# Patient Record
Sex: FEMALE | Race: WHITE | NOT HISPANIC OR LATINO | URBAN - METROPOLITAN AREA
[De-identification: names, ages, dates, MRNs, and addresses within clinical notes are randomized per-mention and may not be internally consistent; named-entity substitution may affect disease eponyms.]

---

## 2017-01-26 PROBLEM — O09.529 SUPERVISION OF HIGH-RISK PREGNANCY OF ELDERLY MULTIGRAVIDA (HCC): Status: ACTIVE | Noted: 2017-01-26

## 2017-01-26 PROBLEM — O34.219 PREVIOUS CESAREAN DELIVERY, ANTEPARTUM CONDITION OR COMPLICATION (HCC): Status: ACTIVE | Noted: 2017-01-26

## 2017-01-26 PROBLEM — O34.219 PREVIOUS CESAREAN DELIVERY, ANTEPARTUM CONDITION OR COMPLICATION: Status: ACTIVE | Noted: 2017-01-26

## 2017-01-26 PROBLEM — O09.529 SUPERVISION OF HIGH-RISK PREGNANCY OF ELDERLY MULTIGRAVIDA: Status: ACTIVE | Noted: 2017-01-26

## 2017-01-27 PROCEDURE — 87389 HIV-1 AG W/HIV-1&-2 AB AG IA: CPT | Performed by: OBSTETRICS & GYNECOLOGY

## 2017-01-27 PROCEDURE — 86762 RUBELLA ANTIBODY: CPT | Performed by: OBSTETRICS & GYNECOLOGY

## 2017-01-27 PROCEDURE — 86901 BLOOD TYPING SEROLOGIC RH(D): CPT | Performed by: OBSTETRICS & GYNECOLOGY

## 2017-01-27 PROCEDURE — 87491 CHLMYD TRACH DNA AMP PROBE: CPT | Performed by: OBSTETRICS & GYNECOLOGY

## 2017-01-27 PROCEDURE — 87591 N.GONORRHOEAE DNA AMP PROB: CPT | Performed by: OBSTETRICS & GYNECOLOGY

## 2017-01-27 PROCEDURE — 86850 RBC ANTIBODY SCREEN: CPT | Performed by: OBSTETRICS & GYNECOLOGY

## 2017-01-27 PROCEDURE — 86900 BLOOD TYPING SEROLOGIC ABO: CPT | Performed by: OBSTETRICS & GYNECOLOGY

## 2017-01-27 PROCEDURE — 87340 HEPATITIS B SURFACE AG IA: CPT | Performed by: OBSTETRICS & GYNECOLOGY

## 2017-01-27 PROCEDURE — 36415 COLL VENOUS BLD VENIPUNCTURE: CPT | Performed by: OBSTETRICS & GYNECOLOGY

## 2017-01-27 PROCEDURE — 86780 TREPONEMA PALLIDUM: CPT | Performed by: OBSTETRICS & GYNECOLOGY

## 2017-01-27 PROCEDURE — 87086 URINE CULTURE/COLONY COUNT: CPT | Performed by: OBSTETRICS & GYNECOLOGY

## 2017-01-27 PROCEDURE — 85025 COMPLETE CBC W/AUTO DIFF WBC: CPT | Performed by: OBSTETRICS & GYNECOLOGY

## 2017-05-19 PROCEDURE — 82951 GLUCOSE TOLERANCE TEST (GTT): CPT | Performed by: OBSTETRICS & GYNECOLOGY

## 2017-05-19 PROCEDURE — 36415 COLL VENOUS BLD VENIPUNCTURE: CPT | Performed by: OBSTETRICS & GYNECOLOGY

## 2017-05-19 PROCEDURE — 82952 GTT-ADDED SAMPLES: CPT | Performed by: OBSTETRICS & GYNECOLOGY

## 2017-05-19 PROCEDURE — 85025 COMPLETE CBC W/AUTO DIFF WBC: CPT | Performed by: OBSTETRICS & GYNECOLOGY

## 2017-06-19 PROCEDURE — 87081 CULTURE SCREEN ONLY: CPT | Performed by: OBSTETRICS & GYNECOLOGY

## 2017-06-19 PROCEDURE — 87147 CULTURE TYPE IMMUNOLOGIC: CPT | Performed by: OBSTETRICS & GYNECOLOGY

## 2017-08-28 PROCEDURE — 87624 HPV HI-RISK TYP POOLED RSLT: CPT | Performed by: OBSTETRICS & GYNECOLOGY

## 2017-08-28 PROCEDURE — 88175 CYTOPATH C/V AUTO FLUID REDO: CPT | Performed by: OBSTETRICS & GYNECOLOGY

## 2019-09-11 PROCEDURE — 88175 CYTOPATH C/V AUTO FLUID REDO: CPT | Performed by: OBSTETRICS & GYNECOLOGY

## 2019-09-11 PROCEDURE — 87624 HPV HI-RISK TYP POOLED RSLT: CPT | Performed by: OBSTETRICS & GYNECOLOGY

## 2019-09-16 ENCOUNTER — WALK IN (OUTPATIENT)
Dept: URGENT CARE | Age: 39
End: 2019-09-16

## 2019-09-16 DIAGNOSIS — H61.23 BILATERAL IMPACTED CERUMEN: Primary | ICD-10-CM

## 2019-09-16 PROCEDURE — 69209 REMOVE IMPACTED EAR WAX UNI: CPT | Performed by: NURSE PRACTITIONER

## 2019-09-16 PROCEDURE — 99203 OFFICE O/P NEW LOW 30 MIN: CPT | Performed by: NURSE PRACTITIONER

## 2019-09-16 SDOH — HEALTH STABILITY: MENTAL HEALTH: HOW OFTEN DO YOU HAVE A DRINK CONTAINING ALCOHOL?: 2-4 TIMES A MONTH

## 2019-09-16 ASSESSMENT — ENCOUNTER SYMPTOMS
DIZZINESS: 0
FEVER: 0
HEADACHES: 0

## 2019-09-16 ASSESSMENT — PAIN SCALES - GENERAL: PAINLEVEL: 0

## 2021-05-13 ENCOUNTER — APPOINTMENT (OUTPATIENT)
Dept: ULTRASOUND IMAGING | Age: 41
End: 2021-05-13
Attending: EMERGENCY MEDICINE
Payer: COMMERCIAL

## 2021-05-13 ENCOUNTER — HOSPITAL ENCOUNTER (EMERGENCY)
Age: 41
Discharge: HOME | End: 2021-05-13
Attending: EMERGENCY MEDICINE
Payer: COMMERCIAL

## 2021-05-13 ENCOUNTER — OFFICE VISIT (OUTPATIENT)
Dept: FAMILY MEDICINE CLINIC | Facility: CLINIC | Age: 41
End: 2021-05-13

## 2021-05-13 VITALS
RESPIRATION RATE: 18 BRPM | OXYGEN SATURATION: 100 % | DIASTOLIC BLOOD PRESSURE: 82 MMHG | HEIGHT: 64 IN | BODY MASS INDEX: 33.8 KG/M2 | TEMPERATURE: 98 F | HEART RATE: 88 BPM | SYSTOLIC BLOOD PRESSURE: 151 MMHG | WEIGHT: 198 LBS

## 2021-05-13 DIAGNOSIS — K80.20 CALCULUS OF GALLBLADDER WITHOUT CHOLECYSTITIS WITHOUT OBSTRUCTION: Primary | ICD-10-CM

## 2021-05-13 DIAGNOSIS — Z02.9 ENCOUNTER FOR ADMINISTRATIVE EXAMINATIONS: Primary | ICD-10-CM

## 2021-05-13 DIAGNOSIS — K80.50 BILIARY COLIC: ICD-10-CM

## 2021-05-13 PROCEDURE — 81001 URINALYSIS AUTO W/SCOPE: CPT | Performed by: EMERGENCY MEDICINE

## 2021-05-13 PROCEDURE — 80053 COMPREHEN METABOLIC PANEL: CPT | Performed by: EMERGENCY MEDICINE

## 2021-05-13 PROCEDURE — 81015 MICROSCOPIC EXAM OF URINE: CPT | Performed by: EMERGENCY MEDICINE

## 2021-05-13 PROCEDURE — 83690 ASSAY OF LIPASE: CPT | Performed by: EMERGENCY MEDICINE

## 2021-05-13 PROCEDURE — 99284 EMERGENCY DEPT VISIT MOD MDM: CPT | Performed by: EMERGENCY MEDICINE

## 2021-05-13 PROCEDURE — 96360 HYDRATION IV INFUSION INIT: CPT | Performed by: EMERGENCY MEDICINE

## 2021-05-13 PROCEDURE — 76700 US EXAM ABDOM COMPLETE: CPT | Performed by: EMERGENCY MEDICINE

## 2021-05-13 PROCEDURE — 85025 COMPLETE CBC W/AUTO DIFF WBC: CPT | Performed by: EMERGENCY MEDICINE

## 2021-05-13 PROCEDURE — 81025 URINE PREGNANCY TEST: CPT | Performed by: EMERGENCY MEDICINE

## 2021-05-13 RX ORDER — DOCUSATE SODIUM 100 MG/1
100 CAPSULE, LIQUID FILLED ORAL 2 TIMES DAILY
COMMUNITY

## 2021-05-13 NOTE — ED INITIAL ASSESSMENT (HPI)
States right upper quadrant pain radiates to her back and down to right hip. States symptoms started on Easter and have been off and on since. This week pain has been persistent every day with nausea pain worse with movement.  Denies injury

## 2021-05-13 NOTE — ED PROVIDER NOTES
Patient Seen in: THE [facility] In [place]      History   Patient presents with:  Abdomen/Flank Pain    Stated Complaint: abdominal pain, sent by walk in     HPI/Subjective:   HPI    42-year-old female presents emergency room for evaluati Current:/82   Pulse 88   Temp 98.4 °F (36.9 °C) (Temporal)   Resp 18   Ht 162.6 cm (5' 4\")   Wt 89.8 kg   LMP 04/20/2021   SpO2 100%   BMI 33.99 kg/m²         Physical Exam    GENERAL: Patient is awake, alert, well-appearing, in no acute distr RAINBOW DRAW LAVENDER   RAINBOW DRAW LIGHT GREEN   RAINBOW DRAW GOLD                   MDM      Patient had IV established, blood work obtained. Ultrasound performed which revealed cholelithiasis, no biliary dilatation, no pericholecystic fluid.   CBC an

## 2021-05-13 NOTE — PROGRESS NOTES
Called pt regarding [facility] appointment. Pt presents with right sided abdominal pain that started around Easter and now worsening. Pain is described as dull and pinching. Now having upper shoulder pain. HX Caseation x3. A little nausea today.    No vomiting

## 2021-05-26 VITALS
OXYGEN SATURATION: 98 % | TEMPERATURE: 97.5 F | DIASTOLIC BLOOD PRESSURE: 68 MMHG | HEART RATE: 64 BPM | RESPIRATION RATE: 16 BRPM | BODY MASS INDEX: 34.31 KG/M2 | HEIGHT: 64 IN | SYSTOLIC BLOOD PRESSURE: 128 MMHG | WEIGHT: 201 LBS

## 2025-03-01 ENCOUNTER — OFFICE VISIT (OUTPATIENT)
Dept: FAMILY MEDICINE CLINIC | Facility: CLINIC | Age: 45
End: 2025-03-01
Payer: COMMERCIAL

## 2025-03-01 VITALS
SYSTOLIC BLOOD PRESSURE: 148 MMHG | HEART RATE: 95 BPM | TEMPERATURE: 98 F | RESPIRATION RATE: 18 BRPM | BODY MASS INDEX: 36 KG/M2 | OXYGEN SATURATION: 99 % | HEIGHT: 63 IN | DIASTOLIC BLOOD PRESSURE: 88 MMHG

## 2025-03-01 DIAGNOSIS — J40 BRONCHITIS WITH WHEEZING: ICD-10-CM

## 2025-03-01 DIAGNOSIS — J01.40 ACUTE NON-RECURRENT PANSINUSITIS: Primary | ICD-10-CM

## 2025-03-01 DIAGNOSIS — H66.001 NON-RECURRENT ACUTE SUPPURATIVE OTITIS MEDIA OF RIGHT EAR WITHOUT SPONTANEOUS RUPTURE OF TYMPANIC MEMBRANE: ICD-10-CM

## 2025-03-01 PROCEDURE — 3079F DIAST BP 80-89 MM HG: CPT | Performed by: NURSE PRACTITIONER

## 2025-03-01 PROCEDURE — 99202 OFFICE O/P NEW SF 15 MIN: CPT | Performed by: NURSE PRACTITIONER

## 2025-03-01 PROCEDURE — 3077F SYST BP >= 140 MM HG: CPT | Performed by: NURSE PRACTITIONER

## 2025-03-01 RX ORDER — PREDNISONE 20 MG/1
40 TABLET ORAL DAILY
Qty: 10 TABLET | Refills: 0 | Status: SHIPPED | OUTPATIENT
Start: 2025-03-01 | End: 2025-03-06

## 2025-03-01 RX ORDER — ALBUTEROL SULFATE 90 UG/1
1-2 INHALANT RESPIRATORY (INHALATION) EVERY 4 HOURS PRN
Qty: 1 EACH | Refills: 0 | Status: SHIPPED | OUTPATIENT
Start: 2025-03-01

## 2025-03-01 NOTE — PROGRESS NOTES
CHIEF COMPLAINT:   cough      HPI:   Nila Edwards is a 45 year old female who presents for ill symptoms for 3 weeks. Patient reports diagnosed with influenza A, fever in beginning. Reports chest congestion, right ear pain, sinus pain and pressure. Denies fever. Symptoms have been worsening since onset.  Treating symptoms with mucinex, benadryl, sudafed, dayquil/nyquil, tylenol/motrin.      Current Outpatient Medications   Medication Sig Dispense Refill    amoxicillin clavulanate 875-125 MG Oral Tab Take 1 tablet by mouth 2 (two) times daily for 7 days. 14 tablet 0    predniSONE 20 MG Oral Tab Take 2 tablets (40 mg total) by mouth daily for 5 days. Take with food 10 tablet 0    albuterol (PROAIR HFA) 108 (90 Base) MCG/ACT Inhalation Aero Soln Inhale 1-2 puffs into the lungs every 4 (four) hours as needed for Wheezing or Shortness of Breath. 1 each 0    pantoprazole 40 MG Oral Tab EC Take 1 tablet (40 mg total) by mouth 2 (two) times daily before meals. 120 tablet 0    docusate sodium 100 MG Oral Cap Take 100 mg by mouth 2 (two) times daily. (Patient not taking: Reported on 10/21/2021)        Past Medical History:    Diabetes (HCC)    IS FOLLOWED BY PCP WITH PERIODIC HGBA1C LEVELS    Elevated blood pressure reading in office without diagnosis of hypertension    PT SEES PCP    Genitourinary disease    HX UTIS      Past Surgical History:   Procedure Laterality Date      //    in 2017 she had BEBA    Cholecystectomy      Oral surgery           Social History     Socioeconomic History    Marital status:    Tobacco Use    Smoking status: Never    Smokeless tobacco: Never   Vaping Use    Vaping status: Never Used   Substance and Sexual Activity    Alcohol use: No    Drug use: No    Sexual activity: Not Currently         REVIEW OF SYSTEMS:   GENERAL: feels well otherwise, good appetite  SKIN: no rashes or abnormal skin lesions  HEENT: See HPI  LUNGS: See HPI  CARDIOVASCULAR:  denies chest pain or palpitations   GI: denies N/V/C or abdominal pain  NEURO: Denies dizziness    EXAM:   /88   Pulse 95   Temp 98.1 °F (36.7 °C)   Resp 18   Ht 5' 3\" (1.6 m)   SpO2 99%   Breastfeeding No   BMI 36.23 kg/m²   GENERAL: well developed, well nourished, in no apparent distress  SKIN: no rashes, no suspicious lesions  HEENT: atraumatic, normocephalic. conjunctiva clear. Right TM: erythematous. left TM gray, no bulging, no retraction, + fluid, bony landmarks intact. clear nasal discharge, nasal mucosa erythematous and swollen. Oral mucosa pink, moist. Posterior pharynx is not erythematous. no exudates. + Sinus tenderness with palpation.   THROAT:NECK: Supple, non-tender  LUNGS: rhonchi and wheezing bilteral  CARDIO: RRR without murmur  EXTREMITIES: no cyanosis, clubbing or edema  LYMP: bilateral anterior cervical lymphadenopathy.    ASSESSMENT AND PLAN:   Nila Edwards is a 45 year old female who presents with     Diagnoses and all orders for this visit:    Acute non-recurrent pansinusitis  -     amoxicillin clavulanate 875-125 MG Oral Tab; Take 1 tablet by mouth 2 (two) times daily for 7 days.    Non-recurrent acute suppurative otitis media of right ear without spontaneous rupture of tympanic membrane  -     amoxicillin clavulanate 875-125 MG Oral Tab; Take 1 tablet by mouth 2 (two) times daily for 7 days.    Bronchitis with wheezing  -     predniSONE 20 MG Oral Tab; Take 2 tablets (40 mg total) by mouth daily for 5 days. Take with food  -     albuterol (PROAIR HFA) 108 (90 Base) MCG/ACT Inhalation Aero Soln; Inhale 1-2 puffs into the lungs every 4 (four) hours as needed for Wheezing or Shortness of Breath.     Declines chest xray.   Will treat with Augmentin for sinus and ear infection. Prednisone and inhaler for wheezing.   Any worsening symptoms consider xray.   Risks, benefits, and side effects of medication explained and discussed.    Discussed physical exam and hpi with pt. Treatment  options discussed with patient and explained in detail. We reviewed symptomatic care at home. The risks, benefits and potential side effects of possible medications were reviewed. Alternatives were discussed. Monitoring parameters and expected course outlined. Patient to call PCP or go to emergency department if symptoms fail to respond as outlined, or worsen in any way. The patient agreed with the plan.  See Patient Handout    The patient indicates understanding of these issues and agrees to the plan.  The patient is asked to follow up with PCP if sx's persist or worsen.

## 2025-03-07 ENCOUNTER — HOSPITAL ENCOUNTER (OUTPATIENT)
Dept: GENERAL RADIOLOGY | Age: 45
Discharge: HOME | End: 2025-03-07
Attending: PHYSICIAN ASSISTANT
Payer: COMMERCIAL

## 2025-03-07 ENCOUNTER — OFFICE VISIT (OUTPATIENT)
Dept: FAMILY MEDICINE CLINIC | Facility: CLINIC | Age: 45
End: 2025-03-07
Payer: COMMERCIAL

## 2025-03-07 VITALS
OXYGEN SATURATION: 96 % | DIASTOLIC BLOOD PRESSURE: 92 MMHG | RESPIRATION RATE: 18 BRPM | TEMPERATURE: 98 F | WEIGHT: 149 LBS | HEART RATE: 84 BPM | SYSTOLIC BLOOD PRESSURE: 144 MMHG | BODY MASS INDEX: 26 KG/M2

## 2025-03-07 DIAGNOSIS — H66.001 ACUTE SUPPURATIVE OTITIS MEDIA OF RIGHT EAR WITHOUT SPONTANEOUS RUPTURE OF TYMPANIC MEMBRANE, RECURRENCE NOT SPECIFIED: Primary | ICD-10-CM

## 2025-03-07 DIAGNOSIS — J32.9 SINOBRONCHITIS: ICD-10-CM

## 2025-03-07 DIAGNOSIS — R05.1 ACUTE COUGH: ICD-10-CM

## 2025-03-07 DIAGNOSIS — J40 SINOBRONCHITIS: ICD-10-CM

## 2025-03-07 PROCEDURE — 71046 X-RAY EXAM CHEST 2 VIEWS: CPT | Performed by: PHYSICIAN ASSISTANT

## 2025-03-07 RX ORDER — ALBUTEROL SULFATE 0.83 MG/ML
2.5 SOLUTION RESPIRATORY (INHALATION) EVERY 6 HOURS PRN
Qty: 90 ML | Refills: 0 | Status: SHIPPED | OUTPATIENT
Start: 2025-03-07

## 2025-03-07 RX ORDER — AZITHROMYCIN 250 MG/1
TABLET, FILM COATED ORAL
Qty: 6 TABLET | Refills: 0 | Status: SHIPPED | OUTPATIENT
Start: 2025-03-07 | End: 2025-03-12

## 2025-03-07 NOTE — PROGRESS NOTES
CHIEF COMPLAINT:     Chief Complaint   Patient presents with    Ear Pain     With right ear clogged for 1 week. No ear drops used.  OTC meds taken.        HPI:   Nila Edwards is a 45 year old female who presents with complaints of feeling sick for over a month.  The patient reports a month ago she was diagnosed with influenza A.  The patient reports ongoing nasal congestion, nasal drainage, sinus pressure, ear pain, and cough.  The patient denies fever, chest pain, shortness of breath, wheezing, abdominal pain, vomiting, diarrhea, and rash.  Patient was seen on the walk-in clinic March 1 for the symptoms and was treated with Augmentin, prednisone, and albuterol.  The patient is also using Flonase daily.  The patient reports improvement in her cough.  The patient is using albuterol twice daily.    Current Outpatient Medications   Medication Sig Dispense Refill    amoxicillin clavulanate 875-125 MG Oral Tab Take 1 tablet by mouth 2 (two) times daily for 4 days. 8 tablet 0    azithromycin (ZITHROMAX Z-MACARIO) 250 MG Oral Tab Take 2 tablets (500 mg total) by mouth daily for 1 day, THEN 1 tablet (250 mg total) daily for 4 days. 6 tablet 0    albuterol (2.5 MG/3ML) 0.083% Inhalation Nebu Soln Take 3 mL (2.5 mg total) by nebulization every 6 (six) hours as needed for Wheezing. 90 mL 0    amoxicillin clavulanate 875-125 MG Oral Tab Take 1 tablet by mouth 2 (two) times daily for 7 days. 14 tablet 0    albuterol (PROAIR HFA) 108 (90 Base) MCG/ACT Inhalation Aero Soln Inhale 1-2 puffs into the lungs every 4 (four) hours as needed for Wheezing or Shortness of Breath. 1 each 0    pantoprazole 40 MG Oral Tab EC Take 1 tablet (40 mg total) by mouth 2 (two) times daily before meals. 120 tablet 0    docusate sodium 100 MG Oral Cap Take 100 mg by mouth 2 (two) times daily. (Patient not taking: Reported on 10/21/2021)        Past Medical History:    Diabetes (HCC)    IS FOLLOWED BY PCP WITH PERIODIC HGBA1C LEVELS    Elevated blood  pressure reading in office without diagnosis of hypertension    PT SEES PCP    Genitourinary disease    HX UTIS      Social History:  Social History     Socioeconomic History    Marital status:    Tobacco Use    Smoking status: Never    Smokeless tobacco: Never   Vaping Use    Vaping status: Never Used   Substance and Sexual Activity    Alcohol use: No    Drug use: No    Sexual activity: Not Currently        REVIEW OF SYSTEMS:   GENERAL: See HPI  SKIN: Denies rashes, skin wounds or ulcers.  EYES: Denies blurred vision or double vision  HENT: See HPI  CHEST: Denies chest pain, or palpitations  LUNGS: See HPI  GI: Denies abdominal pain, N/V/C/D.   MUSCULOSKELETAL: no arthralgia or swollen joints  LYMPH:  Denies lymphadenopathy  NEURO: Denies headaches or lightheadedness      EXAM:   BP (!) 144/92   Pulse 84   Temp 98.1 °F (36.7 °C) (Oral)   Resp 18   Wt 149 lb (67.6 kg)   LMP 02/27/2025 (Approximate)   SpO2 96%   BMI 26.39 kg/m²   GENERAL: well developed, well nourished,in no apparent distress, cooperative   SKIN: no rashes, nosuspicious lesions, no abnormal pigmentation  HEAD: atraumatic, normocephalic  EYES: EOM intact, PERRL.  Conjunctiva normal.  Cornea clear.  Lid margins normal.  No active drainage.  EARS: Right TM erythematous.  Not fully visualized due to cerumen.  Left TM normal, no bulging, no retraction, no fluid, bony landmarks normal.    NOSE: nostrils patent, + discharge, nasal mucosa pink and not inflamed.  No sinus tenderness.  THROAT: oral mucosa pink, moist and intact. No visible dental caries. Posterior pharynx without erythema or exudates.  NECK: supple, non-tender.  LUNGS: Coarse breath sounds bilaterally, no wheezes or rhonchi. Breathing is non labored.  CARDIO: RRR without murmur  GI: No visible scars, or masses. BS's present x4. No palpable masses or hepatosplenomegaly.  Non tender.  No guarding or rebound tenderness  EXTREMITIES: no cyanosis, clubbing or edema.  Homans NEG.   Dorsalis Pedis 2+.  LYMPH:  No lymphadenopathy.    NEURO: A&Ox3.  CN II-XII intact.  No focal deficits.  Coordination and Gait normal.  Kernig and Brudzinski's are negative.    Duoneb: Still with bilateral crackles/coarse breath sounds    Narrative   PROCEDURE:  XR CHEST PA + LAT CHEST (CPT=71046)     INDICATIONS:  R05.1 Acute cough     COMPARISON:  None.     TECHNIQUE:  PA and lateral chest radiographs were obtained.     PATIENT STATED HISTORY: (As transcribed by Technologist)  Pt c/o cough and left sided posterior pain.         FINDINGS:    Cardiac size and pulmonary vasculature are within normal limits. No pleural effusions. No pneumothorax.                   Impression   CONCLUSION:  No acute pulmonary findings.        LOCATION:  [place] [id number]        Dictated by (CST): Danny Lorenz MD on 3/07/2025 at 6:18 PM      Finalized by (CST): Danny Lorenz MD on 3/07/2025 at 6:19 PM           ASSESSMENT AND PLAN:     ASSESSMENT:  Encounter Diagnoses   Name Primary?    Sinobronchitis     Acute suppurative otitis media of right ear without spontaneous rupture of tympanic membrane, recurrence not specified Yes       PLAN:    Patient Instructions   Continue Augmentin  Azithromycin  Albuterol neb-> inhaler  Follow up with PCP   If worse to Immediate Care or ER

## 2025-03-08 NOTE — PATIENT INSTRUCTIONS
Continue Augmentin  Azithromycin  Albuterol neb-> inhaler  Follow up with PCP   If worse to Immediate Care or ER

## 2025-03-15 ENCOUNTER — OFFICE VISIT (OUTPATIENT)
Dept: FAMILY MEDICINE CLINIC | Facility: CLINIC | Age: 45
End: 2025-03-15
Payer: COMMERCIAL

## 2025-03-15 VITALS
SYSTOLIC BLOOD PRESSURE: 128 MMHG | HEART RATE: 97 BPM | DIASTOLIC BLOOD PRESSURE: 82 MMHG | WEIGHT: 149 LBS | RESPIRATION RATE: 16 BRPM | BODY MASS INDEX: 26.4 KG/M2 | TEMPERATURE: 98 F | HEIGHT: 63 IN | OXYGEN SATURATION: 99 %

## 2025-03-15 DIAGNOSIS — R07.81 RIB PAIN: Primary | ICD-10-CM

## 2025-03-15 DIAGNOSIS — R05.8 COUGH PRESENT FOR GREATER THAN 3 WEEKS: ICD-10-CM

## 2025-03-15 DIAGNOSIS — H61.21 EXCESSIVE CERUMEN IN RIGHT EAR CANAL: ICD-10-CM

## 2025-03-15 PROCEDURE — 3074F SYST BP LT 130 MM HG: CPT | Performed by: NURSE PRACTITIONER

## 2025-03-15 PROCEDURE — 99213 OFFICE O/P EST LOW 20 MIN: CPT | Performed by: NURSE PRACTITIONER

## 2025-03-15 PROCEDURE — 3008F BODY MASS INDEX DOCD: CPT | Performed by: NURSE PRACTITIONER

## 2025-03-15 PROCEDURE — 3079F DIAST BP 80-89 MM HG: CPT | Performed by: NURSE PRACTITIONER

## 2025-03-15 NOTE — PROGRESS NOTES
CHIEF COMPLAINT:     Chief Complaint   Patient presents with    Ear Problem     R ear clogged, wanting to get ears irrigated   C/o cough causing back pain x 5 wks      WIC OV 3/7/25 x ear issue       HPI:   Nila Edwards is a 45 year old female who presents for ill symptoms for 7 weeks. Patient reports cough, congestion, now with left sided rib pain. Denies fever, sob. Symptoms have been worsening since onset.    Was treated by this provider on 3/1 for ear/sinus infection with Augmentin, prednisone and inhaler. Returned to clinic on 3/7 and was treated with zpak for sinobronchitis.  Negative chest xray on 3/7.   Requesting removal of right ear wax feels tunnel hearing.      Current Outpatient Medications   Medication Sig Dispense Refill    albuterol (2.5 MG/3ML) 0.083% Inhalation Nebu Soln Take 3 mL (2.5 mg total) by nebulization every 6 (six) hours as needed for Wheezing. 90 mL 0    albuterol (PROAIR HFA) 108 (90 Base) MCG/ACT Inhalation Aero Soln Inhale 1-2 puffs into the lungs every 4 (four) hours as needed for Wheezing or Shortness of Breath. 1 each 0    pantoprazole 40 MG Oral Tab EC Take 1 tablet (40 mg total) by mouth 2 (two) times daily before meals. 120 tablet 0    docusate sodium 100 MG Oral Cap Take 100 mg by mouth 2 (two) times daily. (Patient not taking: Reported on 10/21/2021)        Past Medical History:    Diabetes (HCC)    IS FOLLOWED BY PCP WITH PERIODIC HGBA1C LEVELS    Elevated blood pressure reading in office without diagnosis of hypertension    PT SEES PCP    Genitourinary disease    HX UTIS      Past Surgical History:   Procedure Laterality Date      //2017    in 2017 she had BEBA    Cholecystectomy      Oral surgery           Social History     Socioeconomic History    Marital status:    Tobacco Use    Smoking status: Never    Smokeless tobacco: Never   Vaping Use    Vaping status: Never Used   Substance and Sexual Activity    Alcohol use: No    Drug  use: No    Sexual activity: Not Currently         REVIEW OF SYSTEMS:   GENERAL: feels well otherwise, good appetite  SKIN: no rashes or abnormal skin lesions  HEENT: See HPI  LUNGS: denies shortness of breath or wheezing, See HPI  CARDIOVASCULAR: denies chest pain or palpitations   GI: denies N/V/C or abdominal pain  NEURO: Denies headaches    EXAM:   /82   Pulse 97   Temp 98.3 °F (36.8 °C) (Oral)   Resp 16   Ht 5' 3\" (1.6 m)   Wt 149 lb (67.6 kg)   LMP 02/27/2025 (Approximate)   SpO2 99%   BMI 26.39 kg/m²   GENERAL: well developed, well nourished, in no apparent distress  SKIN: no rashes, no suspicious lesions  HEENT: atraumatic, normocephalic. conjunctiva clear. TM's gray, no bulging, no retraction, + fluid, bony landmarks intact. clear nasal discharge, nasal mucosa erythematous and swollen. Oral mucosa pink, moist. Posterior pharynx is not erythematous. no exudates. No Sinus tenderness with palpation.   THROAT:NECK: Supple, non-tender  LUNGS: clear to auscultation, left rhonchi   CARDIO: RRR without murmur  EXTREMITIES: no cyanosis, clubbing or edema  LYMP: bilateral anterior cervical lymphadenopathy.      Cerumen Removal Procedure  Patient gave verbal consent.  Risks and Benefits of removal were discussed with the patient, who agreed to proceed with procedure.  Location: right ear  Indication decreased hearaing.  Prep Hydrogen Peroxide  Otoscope visualization of cerumen and the curette was used to remove the wax  Patient Status: Tolerated well.  No complications    ASSESSMENT AND PLAN:   Nila Edwards is a 45 year old female who presents with     Nila was seen today for ear problem.    Diagnoses and all orders for this visit:    Rib pain    Cough present for greater than 3 weeks    Excessive cerumen in right ear canal      1. Rib pain    2. Cough present for greater than 3 weeks    Refer to higher level of care regarding rib pain and cough greater than 7 weeks for proper higher level of care  evaluation. This is 3rd visit, pt was on Augmentin, prednisone, zpak, albuterol and still having persisting symptoms.     3. Excessive cerumen in right ear canal  Resolved, removed via curette.         Discussed physical exam and hpi with pt. Treatment options discussed with patient and explained in detail. Needs higher level of care. The patient agreed with the plan.    The patient indicates understanding of these issues and agrees to the plan.